# Patient Record
Sex: FEMALE | Race: WHITE | NOT HISPANIC OR LATINO | Employment: STUDENT | URBAN - METROPOLITAN AREA
[De-identification: names, ages, dates, MRNs, and addresses within clinical notes are randomized per-mention and may not be internally consistent; named-entity substitution may affect disease eponyms.]

---

## 2017-05-09 ENCOUNTER — OFFICE VISIT (OUTPATIENT)
Dept: INTERNAL MEDICINE | Facility: CLINIC | Age: 27
End: 2017-05-09
Payer: COMMERCIAL

## 2017-05-09 ENCOUNTER — NURSE TRIAGE (OUTPATIENT)
Dept: ADMINISTRATIVE | Facility: CLINIC | Age: 27
End: 2017-05-09

## 2017-05-09 VITALS
BODY MASS INDEX: 20.52 KG/M2 | WEIGHT: 130.75 LBS | HEIGHT: 67 IN | HEART RATE: 75 BPM | SYSTOLIC BLOOD PRESSURE: 120 MMHG | DIASTOLIC BLOOD PRESSURE: 70 MMHG | OXYGEN SATURATION: 99 %

## 2017-05-09 DIAGNOSIS — R42 DIZZINESS: Primary | ICD-10-CM

## 2017-05-09 PROCEDURE — 99999 PR PBB SHADOW E&M-EST. PATIENT-LVL III: CPT | Mod: PBBFAC,,, | Performed by: INTERNAL MEDICINE

## 2017-05-09 PROCEDURE — 1160F RVW MEDS BY RX/DR IN RCRD: CPT | Mod: S$GLB,,, | Performed by: INTERNAL MEDICINE

## 2017-05-09 PROCEDURE — 99213 OFFICE O/P EST LOW 20 MIN: CPT | Mod: S$GLB,,, | Performed by: INTERNAL MEDICINE

## 2017-05-09 RX ORDER — LEVOTHYROXINE SODIUM 50 UG/1
50 TABLET ORAL DAILY
COMMUNITY

## 2017-05-09 RX ORDER — MECLIZINE HCL 12.5 MG 12.5 MG/1
12.5 TABLET ORAL 3 TIMES DAILY PRN
Qty: 30 TABLET | Refills: 1 | Status: SHIPPED | OUTPATIENT
Start: 2017-05-09

## 2017-05-09 NOTE — MR AVS SNAPSHOT
[facility]  [address]  [place] [address]  Phone: [phone]  Fax: [phone]                  Paulina Shaffer   2017 7:00 PM   Office Visit    Description:  Female : 1990   Provider:  Regina Otero MD   Department:  [facility]           Reason for Visit     Dizziness           Diagnoses this Visit        Comments    Dizziness    -  Primary            To Do List           Goals (5 Years of Data)     None       These Medications        Disp Refills Start End    meclizine (ANTIVERT) 12.5 mg tablet 30 tablet 1 2017     Take 1 tablet (12.5 mg total) by mouth 3 (three) times daily as needed. - Oral    Pharmacy: [facility]/pharmacy #[id number] - [place], [place] - [address]  #: [phone]         [facility]     [facility] On Call Nurse Care Line -  Assistance  Unless otherwise directed by your provider, please contact [facility], our nurse care line that is available for  assistance.     Registered nurses in the [facility] provide: appointment scheduling, clinical advisement, health education, and other advisory services.  Call: [phone] (toll free)               Medications           Message regarding Medications     Verify the changes and/or additions to your medication regime listed below are the same as discussed with your clinician today.  If any of these changes or additions are incorrect, please notify your healthcare provider.        START taking these NEW medications        Refills    meclizine (ANTIVERT) 12.5 mg tablet 1    Sig: Take 1 tablet (12.5 mg total) by mouth 3 (three) times daily as needed.    Class: Normal    Route: Oral           Verify that the below list of medications is an accurate representation of the medications you are currently taking.  If none reported, the list may be blank. If incorrect, please contact your healthcare provider. Carry this list with you in case of emergency.  "          Current Medications     levothyroxine (SYNTHROID) 50 MCG tablet Take 50 mcg by mouth once daily.    meclizine (ANTIVERT) 12.5 mg tablet Take 1 tablet (12.5 mg total) by mouth 3 (three) times daily as needed.           Clinical Reference Information           Your Vitals Were     BP Pulse Height Weight SpO2 BMI    120/70 (BP Location: Left arm, Patient Position: Sitting, BP Method: Manual) 75 5' 6.5" (1.689 m) 59.3 kg (130 lb 11.7 oz) 99% 20.78 kg/m2      Blood Pressure          Most Recent Value    BP  120/70      Allergies as of 5/9/2017     No Known Allergies      Immunizations Administered on Date of Encounter - 5/9/2017     None      Orders Placed During Today's Visit      Normal Orders This Visit    Ambulatory consult to ENT     Ambulatory consult to Neurology     Future Labs/Procedures Expected by Expires    CBC auto differential  5/9/2017 8/7/2017    Comprehensive metabolic panel  5/9/2017 8/7/2017    T4, free  5/9/2017 8/7/2017    TSH  5/9/2017 5/9/2018      [facility] Sign-Up     Activating your [facility] account is as easy as 1-2-3!     1) Visit [email or web address], select Sign Up Now, enter this activation code and your date of birth, then select Next.  [email or web address]  Expires: 6/23/2017  6:46 PM      2) Create a username and password to use when you visit [facility] in the future and select a security question in case you lose your password and select Next.    3) Enter your e-mail address and click Sign Up!    Additional Information  If you have questions, please e-mail [email or web address] or call [phone] to talk to our [facility] staff. Remember, [facility] is NOT to be used for urgent needs. For medical emergencies, dial 911.         Language Assistance Services     ATTENTION: Language assistance services are available, free of charge. Please call [phone].      ATENCIÓN: Si habla español, tiene a cain disposición servicios gratuitos de asistencia lingüística. Llame al " [phone].     CLARKE Ý: N?u b?n nói Ti?ng Vi?t, có các d?ch v? h? tr? ngôn ng? mi?n phí dành cho b?n. G?i s? [phone].         [facility] complies with applicable Federal civil rights laws and does not discriminate on the basis of race, color, national origin, age, disability, or sex.

## 2017-05-09 NOTE — TELEPHONE ENCOUNTER
"    Reason for Disposition   [1] MODERATE dizziness (e.g., interferes with normal activities) AND [2] has NOT been evaluated by physician for this  (EXCEPTION: dizziness caused by heat exposure, sudden standing, or poor fluid intake)    Answer Assessment - Initial Assessment Questions  1. DESCRIPTION: "Describe your dizziness."     Dizziness mild x several months. On thyroid meds. Bad this weekend. Went away yest. Come back this am.   2. LIGHTHEADED: "Do you feel lightheaded?" (e.g., somewhat faint, woozy, weak upon standing)     Recently moved from [place]. Not too weak to stand. Feels like tipsy sort of constant. Worse with abrupt changes in movement. No DM. Eating and drinking normally  3. VERTIGO: "Do you feel like either you or the room is spinning or tilting?" (i.e. vertigo)     No   4. SEVERITY: "How bad is it?"  "Do you feel like you are going to faint?" "Can you stand and walk?"    - MILD - walking normally    - MODERATE - interferes with normal activities (e.g., work, school)     - SEVERE - unable to stand, requires support to walk, feels like passing out now.      No faint.balance with increased sway.   5. ONSET:  "When did the dizziness begin?"      Beginning of march   6. AGGRAVATING FACTORS: "Does anything make it worse?" (e.g., standing, change in head position)     Abrupt change in position when sitting up   7. HEART RATE: "Can you tell me your heart rate?" "How many beats in 15 seconds?"  (Note: not all patients can do this)        Hx palps. None currently for at least 2 weeks   8. CAUSE: "What do you think is causing the dizziness?"     Unsure - BPPV, vestibular pblm   9. RECURRENT SYMPTOM: "Have you had dizziness before?" If so, ask: "When was the last time?" "What happened that time?"     Yes   10. OTHER SYMPTOMS: "Do you have any other symptoms?" (e.g., fever, chest pain, vomiting, diarrhea, bleeding)      No   11. PREGNANCY: "Is there any chance you are pregnant?" "When was your last menstrual " "period?"     No    Protocols used: ST DIZZINESS-A-AH    Sometimes some pblm with swallow - FH maybe. Mild HA- freq HAs.  Working outside this weekend, drove a long time sx started prior to that. Good uop, denies dry mouth. Pt requesting appt at [facility]. Transferred pt to  for appt at [facility]. rec per protocol for pt to see MD within 24 hours. Call back with questions.   "

## 2017-05-10 ENCOUNTER — TELEPHONE (OUTPATIENT)
Dept: INTERNAL MEDICINE | Facility: CLINIC | Age: 27
End: 2017-05-10

## 2017-05-10 NOTE — PROGRESS NOTES
Subjective:       Patient ID: Paulina Shaffer is a 27 y.o. female.    Chief Complaint: Dizziness (impaired balance, no nausea)    Dizziness:   Chronicity:  Recurrent  Onset:  In the past 7 days  Progression since onset:  Waxing and waning  Frequency:  Every few hours  Pain Scale:  0/10  Severity:  Mild  Duration:  1 minute  Dizziness characteristics:  Spacial disorientation and spinning inside head onlyno ear pain, no fever, no headaches, no tinnitus, no nausea, no vomiting, no diaphoresis, no weakness, no visual disturbances, no light-headedness, no syncope, no palpitations, no panic, no slurred speech and no chest pain.  Aggravated by:  Nothing  Treatments tried:  Nothing  Improvements on treatment:  No relief    Review of Systems   Constitutional: Negative for activity change, chills, diaphoresis, fatigue and fever.   HENT: Negative for congestion, ear pain, nosebleeds, postnasal drip, sinus pressure, sore throat and tinnitus.    Eyes: Negative.  Negative for visual disturbance.   Respiratory: Negative for cough, chest tightness, shortness of breath and wheezing.    Cardiovascular: Negative for chest pain, palpitations and syncope.   Gastrointestinal: Negative for abdominal pain, diarrhea, nausea and vomiting.   Genitourinary: Negative for difficulty urinating, dysuria, frequency and urgency.   Musculoskeletal: Negative for arthralgias and neck stiffness.   Skin: Negative for rash.   Neurological: Positive for dizziness. Negative for weakness, light-headedness and headaches.   Psychiatric/Behavioral: Negative for sleep disturbance. The patient is not nervous/anxious.        Objective:      Physical Exam   Constitutional: She is oriented to person, place, and time. She appears well-developed and well-nourished.  Non-toxic appearance. No distress.   HENT:   Head: Normocephalic and atraumatic.   Right Ear: Tympanic membrane, external ear and ear canal normal.   Left Ear: Tympanic membrane, external ear and ear canal  normal.   Eyes: EOM are normal. Pupils are equal, round, and reactive to light. No scleral icterus.   Neck: Normal range of motion. Neck supple. No thyromegaly present.   Cardiovascular: Normal rate, regular rhythm and normal heart sounds.    Pulmonary/Chest: Effort normal and breath sounds normal.   Abdominal: Soft. Bowel sounds are normal. She exhibits no mass. There is no tenderness. There is no rebound.   Musculoskeletal: Normal range of motion.   Lymphadenopathy:     She has no cervical adenopathy.   Neurological: She is alert and oriented to person, place, and time. She has normal reflexes. She displays normal reflexes. No cranial nerve deficit. She exhibits normal muscle tone. Coordination normal.   Skin: Skin is warm and dry.   Psychiatric: She has a normal mood and affect. Her behavior is normal.       Assessment:       1. Dizziness        Plan:   Paulina was seen today for dizziness.    Diagnoses and all orders for this visit:    Dizziness  -     CBC auto differential; Future  -     Comprehensive metabolic panel; Future  -     TSH; Future  -     T4, free; Future  -     Ambulatory consult to ENT  -     Ambulatory consult to Neurology    Other orders  -     meclizine (ANTIVERT) 12.5 mg tablet; Take 1 tablet (12.5 mg total) by mouth 3 (three) times daily as needed.

## 2017-05-11 ENCOUNTER — TELEPHONE (OUTPATIENT)
Dept: INTERNAL MEDICINE | Facility: CLINIC | Age: 27
End: 2017-05-11

## 2017-05-11 NOTE — TELEPHONE ENCOUNTER
----- Message from Nathaly Lacy MA sent at 5/11/2017  1:50 PM CDT -----  Contact: Rekha/Clwppk-[phone]  Please advise the Pt Mother stated that she will need the Pt's Referral Faxed to Dr. Kent [phone] and to [facility] Dr. Sena  @ [phone]. Thanks!

## 2018-06-04 ENCOUNTER — OFFICE (OUTPATIENT)
Dept: URBAN - METROPOLITAN AREA CLINIC 10 | Facility: CLINIC | Age: 28
End: 2018-06-04

## 2018-06-04 VITALS
WEIGHT: 132 LBS | HEIGHT: 67 IN | DIASTOLIC BLOOD PRESSURE: 69 MMHG | SYSTOLIC BLOOD PRESSURE: 112 MMHG | HEART RATE: 46 BPM

## 2018-06-04 DIAGNOSIS — K21.9 GASTRO-ESOPHAGEAL REFLUX DISEASE WITHOUT ESOPHAGITIS: ICD-10-CM

## 2018-06-04 DIAGNOSIS — F45.8 OTHER SOMATOFORM DISORDERS: ICD-10-CM

## 2018-06-04 DIAGNOSIS — R13.10 DYSPHAGIA, UNSPECIFIED: ICD-10-CM

## 2018-06-04 DIAGNOSIS — R11.0 NAUSEA: ICD-10-CM

## 2018-06-04 PROCEDURE — 99203 OFFICE O/P NEW LOW 30 MIN: CPT | Performed by: INTERNAL MEDICINE

## 2018-11-28 ENCOUNTER — OFFICE (OUTPATIENT)
Dept: URBAN - METROPOLITAN AREA CLINIC 14 | Facility: CLINIC | Age: 28
End: 2018-11-28

## 2018-11-28 VITALS
SYSTOLIC BLOOD PRESSURE: 113 MMHG | DIASTOLIC BLOOD PRESSURE: 74 MMHG | HEIGHT: 67 IN | WEIGHT: 130 LBS | RESPIRATION RATE: 16 BRPM | HEART RATE: 61 BPM

## 2018-11-28 DIAGNOSIS — T88.7XXA UNSPECIFIED ADVERSE EFFECT OF DRUG OR MEDICAMENT, INITIAL EN: ICD-10-CM

## 2018-11-28 DIAGNOSIS — K58.9 IRRITABLE BOWEL SYNDROME WITHOUT DIARRHEA: ICD-10-CM

## 2018-11-28 DIAGNOSIS — R53.83 OTHER FATIGUE: ICD-10-CM

## 2018-11-28 DIAGNOSIS — Z91.011 ALLERGY TO MILK PRODUCTS: ICD-10-CM

## 2018-11-28 LAB
CBC WITH DIFFERENTIAL/PLATELET: BASO (ABSOLUTE): 0 X10E3/UL (ref 0–0.2)
CBC WITH DIFFERENTIAL/PLATELET: BASOS: 1 %
CBC WITH DIFFERENTIAL/PLATELET: EOS (ABSOLUTE): 0.2 X10E3/UL (ref 0–0.4)
CBC WITH DIFFERENTIAL/PLATELET: EOS: 4 %
CBC WITH DIFFERENTIAL/PLATELET: HEMATOCRIT: 48.2 % — HIGH (ref 34–46.6)
CBC WITH DIFFERENTIAL/PLATELET: HEMOGLOBIN: 15.7 G/DL (ref 11.1–15.9)
CBC WITH DIFFERENTIAL/PLATELET: IMMATURE GRANS (ABS): 0 X10E3/UL (ref 0–0.1)
CBC WITH DIFFERENTIAL/PLATELET: IMMATURE GRANULOCYTES: 1 %
CBC WITH DIFFERENTIAL/PLATELET: LYMPHS (ABSOLUTE): 1.1 X10E3/UL (ref 0.7–3.1)
CBC WITH DIFFERENTIAL/PLATELET: LYMPHS: 23 %
CBC WITH DIFFERENTIAL/PLATELET: MCH: 29 PG (ref 26.6–33)
CBC WITH DIFFERENTIAL/PLATELET: MCHC: 32.6 G/DL (ref 31.5–35.7)
CBC WITH DIFFERENTIAL/PLATELET: MCV: 89 FL (ref 79–97)
CBC WITH DIFFERENTIAL/PLATELET: MONOCYTES(ABSOLUTE): 0.4 X10E3/UL (ref 0.1–0.9)
CBC WITH DIFFERENTIAL/PLATELET: MONOCYTES: 9 %
CBC WITH DIFFERENTIAL/PLATELET: NEUTROPHILS (ABSOLUTE): 3 X10E3/UL (ref 1.4–7)
CBC WITH DIFFERENTIAL/PLATELET: NEUTROPHILS: 62 %
CBC WITH DIFFERENTIAL/PLATELET: PLATELETS: 206 X10E3/UL (ref 150–379)
CBC WITH DIFFERENTIAL/PLATELET: RBC: 5.41 X10E6/UL — HIGH (ref 3.77–5.28)
CBC WITH DIFFERENTIAL/PLATELET: RDW: 13.1 % (ref 12.3–15.4)
CBC WITH DIFFERENTIAL/PLATELET: WBC: 4.8 X10E3/UL (ref 3.4–10.8)
CELIAC AB TTG DGP TIGA: DEAMIDATED GLIADIN ABS, IGA: 4 UNITS (ref 0–19)
CELIAC AB TTG DGP TIGA: DEAMIDATED GLIADIN ABS, IGG: 11 UNITS (ref 0–19)
CELIAC AB TTG DGP TIGA: IMMUNOGLOBULIN A, QN, SERUM: 320 MG/DL (ref 87–352)
CELIAC AB TTG DGP TIGA: T-TRANSGLUTAMINASE (TTG) IGA: <2 U/ML
CELIAC AB TTG DGP TIGA: T-TRANSGLUTAMINASE (TTG) IGG: 2 U/ML (ref 0–5)
COMP. METABOLIC PANEL (14): A/G RATIO: 1.9 (ref 1.2–2.2)
COMP. METABOLIC PANEL (14): ALBUMIN: 5.1 G/DL (ref 3.5–5.5)
COMP. METABOLIC PANEL (14): ALKALINE PHOSPHATASE: 50 IU/L (ref 39–117)
COMP. METABOLIC PANEL (14): ALT (SGPT): 12 IU/L (ref 0–32)
COMP. METABOLIC PANEL (14): AST (SGOT): 17 IU/L (ref 0–40)
COMP. METABOLIC PANEL (14): BILIRUBIN, TOTAL: 0.5 MG/DL (ref 0–1.2)
COMP. METABOLIC PANEL (14): BUN/CREATININE RATIO: 13 (ref 9–23)
COMP. METABOLIC PANEL (14): BUN: 11 MG/DL (ref 6–20)
COMP. METABOLIC PANEL (14): CALCIUM: 10.1 MG/DL (ref 8.7–10.2)
COMP. METABOLIC PANEL (14): CARBON DIOXIDE, TOTAL: 24 MMOL/L (ref 20–29)
COMP. METABOLIC PANEL (14): CHLORIDE: 100 MMOL/L (ref 96–106)
COMP. METABOLIC PANEL (14): CREATININE: 0.86 MG/DL (ref 0.57–1)
COMP. METABOLIC PANEL (14): EGFR IF AFRICN AM: 106 ML/MIN/1.73 (ref 59–?)
COMP. METABOLIC PANEL (14): EGFR IF NONAFRICN AM: 92 ML/MIN/1.73 (ref 59–?)
COMP. METABOLIC PANEL (14): GLOBULIN, TOTAL: 2.7 G/DL (ref 1.5–4.5)
COMP. METABOLIC PANEL (14): GLUCOSE: 85 MG/DL (ref 65–99)
COMP. METABOLIC PANEL (14): POTASSIUM: 4.5 MMOL/L (ref 3.5–5.2)
COMP. METABOLIC PANEL (14): PROTEIN, TOTAL: 7.8 G/DL (ref 6–8.5)
COMP. METABOLIC PANEL (14): SODIUM: 140 MMOL/L (ref 134–144)

## 2018-11-28 PROCEDURE — 99213 OFFICE O/P EST LOW 20 MIN: CPT

## 2019-11-27 ENCOUNTER — OFFICE (OUTPATIENT)
Dept: URBAN - METROPOLITAN AREA CLINIC 11 | Facility: CLINIC | Age: 29
End: 2019-11-27
Payer: COMMERCIAL

## 2019-11-27 VITALS
WEIGHT: 140 LBS | HEIGHT: 67 IN | DIASTOLIC BLOOD PRESSURE: 72 MMHG | SYSTOLIC BLOOD PRESSURE: 114 MMHG | HEART RATE: 70 BPM

## 2019-11-27 DIAGNOSIS — K29.70 GASTRITIS, UNSPECIFIED, WITHOUT BLEEDING: ICD-10-CM

## 2019-11-27 DIAGNOSIS — Z91.018 ALLERGY TO OTHER FOODS: ICD-10-CM

## 2019-11-27 DIAGNOSIS — Z83.71 FAMILY HISTORY OF COLONIC POLYPS: ICD-10-CM

## 2019-11-27 DIAGNOSIS — R10.13 EPIGASTRIC PAIN: ICD-10-CM

## 2019-11-27 PROCEDURE — 99213 OFFICE O/P EST LOW 20 MIN: CPT

## 2019-11-27 RX ORDER — ESOMEPRAZOLE MAGNESIUM 40 MG/1
40 CAPSULE, DELAYED RELEASE ORAL
Refills: 0 | Status: COMPLETED
End: 2019-11-27

## 2019-11-27 RX ORDER — FAMOTIDINE 40 MG/1
TABLET, FILM COATED ORAL
Qty: 60 | Refills: 2 | Status: COMPLETED
Start: 2019-11-27 | End: 2021-11-18

## 2021-11-18 ENCOUNTER — OFFICE (OUTPATIENT)
Dept: URBAN - METROPOLITAN AREA CLINIC 10 | Facility: CLINIC | Age: 31
End: 2021-11-18

## 2021-11-18 VITALS
HEART RATE: 57 BPM | SYSTOLIC BLOOD PRESSURE: 111 MMHG | WEIGHT: 134 LBS | OXYGEN SATURATION: 98 % | DIASTOLIC BLOOD PRESSURE: 66 MMHG

## 2021-11-18 DIAGNOSIS — K80.20 CALCULUS OF GALLBLADDER WITHOUT CHOLECYSTITIS WITHOUT OBSTRU: ICD-10-CM

## 2021-11-18 DIAGNOSIS — R10.11 RIGHT UPPER QUADRANT PAIN: ICD-10-CM

## 2021-11-18 DIAGNOSIS — K81.9 CHOLECYSTITIS, UNSPECIFIED: ICD-10-CM

## 2021-11-18 LAB
CBC WITH DIFFERENTIAL/PLATELET: BASO (ABSOLUTE): 0.1 X10E3/UL (ref 0–0.2)
CBC WITH DIFFERENTIAL/PLATELET: BASOS: 2 %
CBC WITH DIFFERENTIAL/PLATELET: EOS (ABSOLUTE): 0 X10E3/UL (ref 0–0.4)
CBC WITH DIFFERENTIAL/PLATELET: EOS: 0 %
CBC WITH DIFFERENTIAL/PLATELET: HEMATOCRIT: 46.2 % (ref 34–46.6)
CBC WITH DIFFERENTIAL/PLATELET: HEMOGLOBIN: 15.1 G/DL (ref 11.1–15.9)
CBC WITH DIFFERENTIAL/PLATELET: IMMATURE GRANS (ABS): 0 X10E3/UL (ref 0–0.1)
CBC WITH DIFFERENTIAL/PLATELET: IMMATURE GRANULOCYTES: 1 %
CBC WITH DIFFERENTIAL/PLATELET: LYMPHS (ABSOLUTE): 1.2 X10E3/UL (ref 0.7–3.1)
CBC WITH DIFFERENTIAL/PLATELET: LYMPHS: 35 %
CBC WITH DIFFERENTIAL/PLATELET: MCH: 28.7 PG (ref 26.6–33)
CBC WITH DIFFERENTIAL/PLATELET: MCHC: 32.7 G/DL (ref 31.5–35.7)
CBC WITH DIFFERENTIAL/PLATELET: MCV: 88 FL (ref 79–97)
CBC WITH DIFFERENTIAL/PLATELET: MONOCYTES(ABSOLUTE): 0.3 X10E3/UL (ref 0.1–0.9)
CBC WITH DIFFERENTIAL/PLATELET: MONOCYTES: 9 %
CBC WITH DIFFERENTIAL/PLATELET: NEUTROPHILS (ABSOLUTE): 1.8 X10E3/UL (ref 1.4–7)
CBC WITH DIFFERENTIAL/PLATELET: NEUTROPHILS: 53 %
CBC WITH DIFFERENTIAL/PLATELET: PLATELETS: 222 X10E3/UL (ref 150–450)
CBC WITH DIFFERENTIAL/PLATELET: RBC: 5.26 X10E6/UL (ref 3.77–5.28)
CBC WITH DIFFERENTIAL/PLATELET: RDW: 12.5 % (ref 11.7–15.4)
CBC WITH DIFFERENTIAL/PLATELET: WBC: 3.4 X10E3/UL (ref 3.4–10.8)
COMP. METABOLIC PANEL (14): A/G RATIO: 1.8 (ref 1.2–2.2)
COMP. METABOLIC PANEL (14): ALBUMIN: 4.6 G/DL (ref 3.8–4.8)
COMP. METABOLIC PANEL (14): ALKALINE PHOSPHATASE: 79 IU/L (ref 44–121)
COMP. METABOLIC PANEL (14): ALT (SGPT): 12 IU/L (ref 0–32)
COMP. METABOLIC PANEL (14): AST (SGOT): 16 IU/L (ref 0–40)
COMP. METABOLIC PANEL (14): BILIRUBIN, TOTAL: 0.4 MG/DL (ref 0–1.2)
COMP. METABOLIC PANEL (14): BUN/CREATININE RATIO: 12 (ref 9–23)
COMP. METABOLIC PANEL (14): BUN: 9 MG/DL (ref 6–20)
COMP. METABOLIC PANEL (14): CALCIUM: 9.6 MG/DL (ref 8.7–10.2)
COMP. METABOLIC PANEL (14): CARBON DIOXIDE, TOTAL: 25 MMOL/L (ref 20–29)
COMP. METABOLIC PANEL (14): CHLORIDE: 103 MMOL/L (ref 96–106)
COMP. METABOLIC PANEL (14): CREATININE: 0.78 MG/DL (ref 0.57–1)
COMP. METABOLIC PANEL (14): EGFR IF AFRICN AM: 117 ML/MIN/1.73 (ref 59–?)
COMP. METABOLIC PANEL (14): EGFR IF NONAFRICN AM: 102 ML/MIN/1.73 (ref 59–?)
COMP. METABOLIC PANEL (14): GLOBULIN, TOTAL: 2.5 G/DL (ref 1.5–4.5)
COMP. METABOLIC PANEL (14): GLUCOSE: 70 MG/DL (ref 65–99)
COMP. METABOLIC PANEL (14): POTASSIUM: 4.1 MMOL/L (ref 3.5–5.2)
COMP. METABOLIC PANEL (14): PROTEIN, TOTAL: 7.1 G/DL (ref 6–8.5)
COMP. METABOLIC PANEL (14): SODIUM: 141 MMOL/L (ref 134–144)

## 2021-11-18 PROCEDURE — 99214 OFFICE O/P EST MOD 30 MIN: CPT | Performed by: PHYSICIAN ASSISTANT

## 2021-11-18 RX ORDER — HYOSCYAMINE SULFATE 0.12 MG/1
TABLET ORAL; SUBLINGUAL
Qty: 60 | Refills: 1 | Status: ACTIVE
Start: 2021-11-18